# Patient Record
Sex: FEMALE | Race: WHITE | NOT HISPANIC OR LATINO | Employment: OTHER | RURAL
[De-identification: names, ages, dates, MRNs, and addresses within clinical notes are randomized per-mention and may not be internally consistent; named-entity substitution may affect disease eponyms.]

---

## 2017-06-05 RX ORDER — LOSARTAN POTASSIUM 50 MG/1
TABLET ORAL
Qty: 30 TABLET | Refills: 6 | Status: SHIPPED | OUTPATIENT
Start: 2017-06-05 | End: 2017-10-04 | Stop reason: SDUPTHER

## 2017-07-05 RX ORDER — VERAPAMIL HYDROCHLORIDE 240 MG/1
TABLET, FILM COATED, EXTENDED RELEASE ORAL
Qty: 30 TABLET | Refills: 6 | Status: SHIPPED | OUTPATIENT
Start: 2017-07-05 | End: 2017-10-04 | Stop reason: SDUPTHER

## 2017-10-04 RX ORDER — VERAPAMIL HYDROCHLORIDE 240 MG/1
240 TABLET, FILM COATED, EXTENDED RELEASE ORAL NIGHTLY
Qty: 90 TABLET | Refills: 3 | Status: SHIPPED | OUTPATIENT
Start: 2017-10-04 | End: 2018-09-14 | Stop reason: SDUPTHER

## 2017-10-04 RX ORDER — LOSARTAN POTASSIUM 50 MG/1
50 TABLET ORAL DAILY
Qty: 90 TABLET | Refills: 3 | Status: SHIPPED | OUTPATIENT
Start: 2017-10-04 | End: 2018-09-14 | Stop reason: SDUPTHER

## 2017-12-15 ENCOUNTER — OFFICE VISIT (OUTPATIENT)
Dept: CARDIOLOGY | Facility: CLINIC | Age: 67
End: 2017-12-15

## 2017-12-15 VITALS
HEART RATE: 81 BPM | WEIGHT: 225.4 LBS | HEIGHT: 62 IN | DIASTOLIC BLOOD PRESSURE: 90 MMHG | BODY MASS INDEX: 41.48 KG/M2 | SYSTOLIC BLOOD PRESSURE: 160 MMHG

## 2017-12-15 DIAGNOSIS — I10 ESSENTIAL HYPERTENSION: ICD-10-CM

## 2017-12-15 DIAGNOSIS — R00.2 PALPITATIONS: Primary | ICD-10-CM

## 2017-12-15 PROCEDURE — 99213 OFFICE O/P EST LOW 20 MIN: CPT | Performed by: INTERNAL MEDICINE

## 2017-12-15 RX ORDER — AMOXICILLIN 500 MG/1
CAPSULE ORAL EVERY 6 HOURS
COMMUNITY
Start: 2017-12-14 | End: 2019-07-19

## 2017-12-15 NOTE — PROGRESS NOTES
[facility]  Office visit  Nirmala Malik  1950  [phone]    VISIT DATE:  12/15/2017    PCP: Bharti Bowden, APRN  [address] [facility] [id number]    CC:  Chief Complaint   Patient presents with   • Palpitations       PROBLEM LIST:  1. Palpitations:  a. Holter monitor, 04/12/2002:  Sinus rhythm with  frequent PACs; PVCs in a trigeminal pattern on several occasions.  b. Echocardiogram, 05/05/2010, with EF greater than 55%, diastolic dysfunction, trace TR.  c. Stress test in the past with negative ischemia, questionable data deficient.   2. Allergic rhinitis.  3. Anxiety disorder.  4. Obesity.  5. Remote colitis.  6. Remote wisdom tooth extraction.    ASSESSMENT:   Diagnosis Plan   1. Palpitations     2. Essential hypertension         PLAN:  Palpitations: Well-controlled.  Likely symptomatic premature atrial contractions.  Continue verapamil 240 mg by mouth daily    Hypertension: Goal less than 130/80.  Usually well controlled.  Elevated today due to pain from tooth infection.  Continue combination of losartan and verapamil.    Subjective  67-year-old female with a history of hypertension and premature atrial contractions.  Reports her blood pressures typically run less than 130/80 mmHg.  Intermittent mild fatigue in the afternoons.  Blood pressures in the afternoons run in the 115/65 mmHg range.  She is compliant with medical therapy.  Reports left lower jaw discomfort and was on antibiotics for a tooth infection.  Recent family stress due to illness of her parents.  Otherwise reports stable functional capacity.    PHYSICAL EXAMINATION:  There were no vitals filed for this visit.  General Appearance:    Alert, cooperative, no distress, appears stated age   Head:    Normocephalic, without obvious abnormality, atraumatic   Eyes:    conjunctiva/corneas clear   Nose:   Nares normal, septum midline, mucosa normal, no drainage   Throat:   Lips, teeth and gums normal   Neck:    Supple, symmetrical, trachea midline, no carotid    bruit or JVD   Lungs:     Clear to auscultation bilaterally, respirations unlabored   Chest Wall:    No tenderness or deformity    Heart:    Regular rate and rhythm, S1 and S2 normal, no murmur, rub   or gallop, normal carotid impulse bilaterally without bruit.   Abdomen:     Soft, non-tender   Extremities:   Extremities normal, atraumatic, no cyanosis or edema   Pulses:   2+ and symmetric all extremities   Skin:   Skin color, texture, turgor normal, no rashes or lesions       Diagnostic Data:  Procedures  No results found for: CHLPL, TRIG, HDL, LDLDIRECT  No results found for: GLUCOSE, BUN, CREATININE, NA, K, CL, CO2, CREATININE, BUN  No results found for: HGBA1C  No results found for: WBC, HGB, HCT, PLT    Allergies  Allergies   Allergen Reactions   • Codeine    • Penicillins        Current Medications    Current Outpatient Prescriptions:   •  losartan (COZAAR) 50 MG tablet, Take 1 tablet by mouth Daily., Disp: 90 tablet, Rfl: 3  •  Multiple Vitamins-Minerals (EYE VITAMINS & MINERALS PO), Take  by mouth Daily., Disp: , Rfl:   •  omeprazole (priLOSEC) 20 MG capsule, Take 20 mg by mouth Daily., Disp: , Rfl:   •  verapamil SR (CALAN-SR) 240 MG CR tablet, Take 1 tablet by mouth Every Night., Disp: 90 tablet, Rfl: 3  •  VITAMIN D, CHOLECALCIFEROL, PO, Take 1,000 Units by mouth Daily., Disp: , Rfl:   •  amoxicillin (AMOXIL) 500 MG capsule, Every 6 (Six) Hours., Disp: , Rfl:           ROS  Review of Systems   Cardiovascular: Positive for irregular heartbeat and palpitations. Negative for chest pain, dyspnea on exertion, orthopnea and syncope.   Respiratory: Negative for cough, shortness of breath, snoring and wheezing.          SOCIAL HX  Social History     Social History   • Marital status:      Spouse name: N/A   • Number of children: N/A   • Years of education: N/A     Occupational History   • Not on file.     Social History Main Topics   • Smoking status:  Unknown If Ever Smoked   • Smokeless tobacco: Not on file   • Alcohol use Defer   • Drug use: Defer   • Sexual activity: Defer     Other Topics Concern   • Not on file     Social History Narrative   • No narrative on file       FAMILY HX  No family history on file.          Maninder Mathias III, MD, FACC

## 2018-09-14 RX ORDER — LOSARTAN POTASSIUM 50 MG/1
TABLET ORAL
Qty: 90 TABLET | Refills: 3 | Status: SHIPPED | OUTPATIENT
Start: 2018-09-14 | End: 2019-07-19

## 2018-09-14 RX ORDER — VERAPAMIL HYDROCHLORIDE 240 MG/1
TABLET, FILM COATED, EXTENDED RELEASE ORAL
Qty: 90 TABLET | Refills: 3 | Status: SHIPPED | OUTPATIENT
Start: 2018-09-14 | End: 2019-10-02 | Stop reason: SDUPTHER

## 2019-01-18 ENCOUNTER — OFFICE VISIT (OUTPATIENT)
Dept: CARDIOLOGY | Facility: CLINIC | Age: 69
End: 2019-01-18

## 2019-01-18 VITALS
SYSTOLIC BLOOD PRESSURE: 148 MMHG | WEIGHT: 218 LBS | HEIGHT: 55 IN | HEART RATE: 80 BPM | DIASTOLIC BLOOD PRESSURE: 80 MMHG | BODY MASS INDEX: 50.45 KG/M2

## 2019-01-18 DIAGNOSIS — R00.2 PALPITATIONS: Primary | ICD-10-CM

## 2019-01-18 DIAGNOSIS — I10 ESSENTIAL HYPERTENSION: ICD-10-CM

## 2019-01-18 PROCEDURE — 99213 OFFICE O/P EST LOW 20 MIN: CPT | Performed by: INTERNAL MEDICINE

## 2019-01-18 NOTE — PROGRESS NOTES
"      LOCATION:  [facility]     PROBLEM LIST:  1.  Palpitations:  a. Holter monitor, 04/12/2002:  Sinus rhythm with  frequent PACs; PVCs in a trigeminal pattern on several occasions.  b. Echocardiogram, 05/05/2010, with EF greater than 55%, diastolic dysfunction, trace TR.  c. Stress test in the past with negative ischemia, questionable data deficient.   2. Allergic rhinitis.  3. Anxiety disorder.  4. Obesity.  5. Remote colitis.  6. Remote wisdom tooth extraction.     ALLERGIES/DRUG INTOLERANCES:      1. CODEINE.  2. PENICILLIN.     CURRENT MEDICATIONS:        Current Outpatient Medications:   •  losartan (COZAAR) 50 MG tablet, TAKE 1 TABLET DAILY, Disp: 90 tablet, Rfl: 3  •  Multiple Vitamins-Minerals (EYE VITAMINS & MINERALS PO), Take  by mouth Daily., Disp: , Rfl:   •  omeprazole (priLOSEC) 20 MG capsule, Take 20 mg by mouth Daily., Disp: , Rfl:   •  verapamil SR (CALAN-SR) 240 MG CR tablet, TAKE 1 TABLET EVERY NIGHT, Disp: 90 tablet, Rfl: 3  •  VITAMIN D, CHOLECALCIFEROL, PO, Take 1,000 Units by mouth Daily., Disp: , Rfl:   •  amoxicillin (AMOXIL) 500 MG capsule, Every 6 (Six) Hours., Disp: , Rfl:     SUBJECTIVE: Patient is here for followup.  She has been doing well. Having some issues with a rash ashley out in the sun. Palpitations are better but with less sleep and caffeine gets worse. BP more stable as well.      REVIEW OF SYSTEMS:  All pertinent positives of review of systems as per HPI and other parts of notes.  All other review of systems were reviewed and negative.    PHYSICAL EXAMINATION:   /80 (BP Location: Left arm, Patient Position: Sitting)   Pulse 80   Ht 62 cm (24.41\")   Wt 98.9 kg (218 lb)   .25 kg/m²      GENERAL: Pleasant, cooperative, well nourished, well developed. No acute distress. Alert and oriented x3.   HEENT: Normocephalic, atraumatic. EOMI, PERRLA.  NECK: No JVD at 30°. No carotid bruits auscultated. No thyromegaly or cervical  adenopathy noted.  LUNGS: Clear to " auscultation bilaterally. No wheezes, rhonchi, or rales.   CARDIAC: Regular rate and rhythm. Normal S1, S2. No murmurs, gallops, or rubs noted.  ABDOMEN: Soft and nontender. Bowel sounds present. No hepatosplenomegaly.  No bruits noted.  MUSCULOSKELETAL: No bony abnormalities. Normal range of motion.  NEUROLOGIC: No focal deficits. Cranial nerves II-XII grossly intact. Normal gait.   EXTREMITIES: No edema. Pulses 2+.    SKIN: Warm, dry, and intact. No rashes  noted.  PSYCHIATRIC: Normal affect.    IMPRESSION:  1. Palpitations,  most likely consistent with premature atrial contractions and premature ventricular contractions at times and trigeminal pattern on several occasions on Holter monitor in the past. She has occasional palpitations; however, not as severe as currently on  the Verapamil  mg p.o. daily. She is actually doing well overall.   2. Hypertension stable  3. Patient to follow up with us in 6 mths    James Chong DO  11:05 AM  01/18/19

## 2019-01-24 ENCOUNTER — TELEPHONE (OUTPATIENT)
Dept: CARDIOLOGY | Facility: CLINIC | Age: 69
End: 2019-01-24

## 2019-04-05 ENCOUNTER — TELEPHONE (OUTPATIENT)
Dept: CARDIOLOGY | Facility: CLINIC | Age: 69
End: 2019-04-05

## 2019-04-05 NOTE — TELEPHONE ENCOUNTER
"PT calling with more \"Skipped beats\" she was a [facility] pt but hasn't seen anyone else. I transferred her to Kiley Nagel to schedule with a PA or Brando Mahmood.  "

## 2019-05-14 ENCOUNTER — TELEPHONE (OUTPATIENT)
Dept: CARDIOLOGY | Facility: CLINIC | Age: 69
End: 2019-05-14

## 2019-05-14 RX ORDER — AMLODIPINE BESYLATE 5 MG/1
5 TABLET ORAL DAILY
Qty: 30 TABLET | Refills: 11 | Status: SHIPPED | OUTPATIENT
Start: 2019-05-14 | End: 2019-07-19 | Stop reason: ALTCHOICE

## 2019-05-14 NOTE — TELEPHONE ENCOUNTER
PT's losartan 50mg has been recalled she needs another prescription called into [facility] in [facility]. She doesn't see Will Lesvia until July and she was a [facility] patient. Do you mind letting me know what med to call in?

## 2019-05-14 NOTE — TELEPHONE ENCOUNTER
I think all the ARBs are on recall?? Looks like she was on it for BP only. Can switch to Norvasc 5 mg, check BP daily. Call if running high.

## 2019-07-19 ENCOUNTER — OFFICE VISIT (OUTPATIENT)
Dept: CARDIOLOGY | Facility: CLINIC | Age: 69
End: 2019-07-19

## 2019-07-19 VITALS
DIASTOLIC BLOOD PRESSURE: 70 MMHG | HEIGHT: 62 IN | WEIGHT: 220 LBS | SYSTOLIC BLOOD PRESSURE: 130 MMHG | HEART RATE: 87 BPM | OXYGEN SATURATION: 95 % | BODY MASS INDEX: 40.48 KG/M2

## 2019-07-19 DIAGNOSIS — R00.2 PALPITATIONS: Primary | ICD-10-CM

## 2019-07-19 DIAGNOSIS — I10 ESSENTIAL HYPERTENSION: ICD-10-CM

## 2019-07-19 PROCEDURE — 99214 OFFICE O/P EST MOD 30 MIN: CPT | Performed by: PHYSICIAN ASSISTANT

## 2019-07-19 RX ORDER — HYDROCHLOROTHIAZIDE 12.5 MG/1
12.5 TABLET ORAL AS NEEDED
COMMUNITY
End: 2020-01-17 | Stop reason: SDUPTHER

## 2019-07-19 RX ORDER — LOSARTAN POTASSIUM 50 MG/1
50 TABLET ORAL DAILY
Qty: 30 TABLET | Refills: 6 | Status: SHIPPED | OUTPATIENT
Start: 2019-07-19 | End: 2020-01-17 | Stop reason: SDUPTHER

## 2019-07-19 RX ORDER — ERGOCALCIFEROL 1.25 MG/1
50000 CAPSULE ORAL WEEKLY
COMMUNITY
End: 2020-09-18

## 2019-07-19 NOTE — PROGRESS NOTES
[facility]   OFFICE NOTE      Nirmala Malik  1950  PCP: Bharti Bowden APRN    SUBJECTIVE:   Nirmala Malik is a 68 y.o. female seen for a follow up visit regarding the following:     CC: Palpitations         HPI:   Patient presents today follow-up regarding palpitations with remote monitor revealing PACs PVCs.  She recently presented to [facility] has her hospital for work-up regarding recurrent persistent palpitations.  There she had a 24-hour monitor and a stress test and echocardiogram which she states were all reported as normal.  The verapamil has been working well controlling palpitations except and she had a breakthrough episode in May.  She now states palpitations now improved she feels it may been related to a supplement she was taking.  She was concerned to take losartan in the past but was switched to amlodipine and feels she is having side effects from this medication would like to switch back to losartan.  She states her blood pressure is controlled otherwise.  She denies any chest pain suggesting angina.  She denies any heart failure symptoms.    Cardiac PMH: (Old records have been reviewed and summarized below)  1.  Palpitations:  a. Holter monitor, 04/12/2002:  Sinus rhythm with  frequent PACs; PVCs in a trigeminal pattern on several occasions.  b. Echocardiogram, 05/05/2010, with EF greater than 55%, diastolic dysfunction, trace TR.  c. Stress test in the past with negative ischemia, questionable data deficient.   2. Allergic rhinitis.  3. Anxiety disorder.  4. Obesity.  5. Remote colitis.  6. Remote wisdom tooth extraction.           Past Medical History, Past Surgical History, Family history, Social History, and Medications were all reviewed with the patient today and updated as necessary.       Current Outpatient Medications:   •  hydrochlorothiazide (HYDRODIURIL) 12.5 MG tablet, Take 12.5 mg by mouth As Needed., Disp: , Rfl:   •  Multiple  "Vitamins-Minerals (PRESERVISION AREDS PO), Take  by mouth Daily., Disp: , Rfl:   •  omeprazole (priLOSEC) 20 MG capsule, Take 20 mg by mouth Daily., Disp: , Rfl:   •  verapamil SR (CALAN-SR) 240 MG CR tablet, TAKE 1 TABLET EVERY NIGHT, Disp: 90 tablet, Rfl: 3  •  vitamin D (ERGOCALCIFEROL) 99937 units capsule capsule, Take 50,000 Units by mouth 1 (One) Time Per Week., Disp: , Rfl:   •  losartan (COZAAR) 50 MG tablet, Take 1 tablet by mouth Daily., Disp: 30 tablet, Rfl: 6      No Known Allergies  Patient Active Problem List   Diagnosis   • Palpitations   • Essential hypertension     Past Medical History:   Diagnosis Date   • Anxiety    • Arrhythmia    • Colitis      Past Surgical History:   Procedure Laterality Date   • WISDOM TOOTH EXTRACTION       History reviewed. No pertinent family history.  Social History     Tobacco Use   • Smoking status: Never Smoker   • Smokeless tobacco: Never Used   Substance Use Topics   • Alcohol use: No       ROS:  Review of Symptoms:  General: no recent weight loss/gain, weakness or fatigue  Skin: no rashes, lumps, or other skin changes  HEENT: no dizziness, lightheadedness, or vision changes  Respiratory: no cough or hemoptysis  Cardiovascular: + palpitations, and tachycardia  Gastrointestinal: no black/tarry stools or diarrhea  Urinary: no change in frequency or urgency  Peripheral Vascular: no claudication or leg cramps  Musculoskeletal: no muscle or joint pain/stiffness  Psychiatric: no depression or excessive stress  Neurological: no sensory or motor loss, no syncope  Hematologic: no anemia, easy bruising or bleeding  Endocrine: no thyroid problems, nor heat or cold intolerance    PHYSICAL EXAM:    /70 (BP Location: Left arm, Patient Position: Sitting)   Pulse 87   Ht 157.5 cm (62\")   Wt 99.8 kg (220 lb)   SpO2 95%   BMI 40.24 kg/m²        Wt Readings from Last 5 Encounters:   07/19/19 99.8 kg (220 lb)   01/18/19 98.9 kg (218 lb)   12/15/17 102 kg (225 lb 6.4 oz) "   11/18/16 97.5 kg (215 lb)       BP Readings from Last 5 Encounters:   07/19/19 130/70   01/18/19 148/80   12/15/17 160/90   11/18/16 140/80       General appearance - Alert, well appearing, and in no distress   Mental status - Affect appropriate to mood.  Eyes - Sclerae anicteric,  ENMT - Hearing grossly normal bilaterally, Dental hygiene good.  Neck - Carotids upstroke normal bilaterally, no bruits, no JVD.  Resp - Clear to auscultation, no wheezes, rales or rhonchi, symmetric air entry.  Heart - Normal rate, regular rhythm, normal S1, S2, no murmurs, rubs, clicks or gallops.  GI - Soft, nontender, nondistended, no masses or organomegaly.  Neurological - Grossly intact - normal speech, no focal findings  Musculoskeletal - No joint tenderness, deformity or swelling, no muscular tenderness noted.  Extremities - Peripheral pulses normal, no pedal edema, no clubbing or cyanosis.  Skin - Normal coloration and turgor.  Psych -  oriented to person, place, and time.    Medical problems and test results were reviewed with the patient today.     No results found for this or any previous visit (from the past 672 hour(s)).      :      ASSESSMENT   1.  Palpitations: History of frequent PACs and PVCs.  Breakthrough episode in May with admission to [facility] multiple test including Holter monitor stress test and echocardiogram which she reports is normal.  She states her palpitations now improved after she stopped a vitamin supplement she was taking.  2.  Hypertension: Patient feels that she is having side effects on amlodipine.  She like to switch back to her losartan if possible.    PLAN  · Discontinue amlodipine and continue losartan 50 mg daily.  Continue to monitor blood pressure with diet exercise weight loss and sodium restriction.  · Continue verapamil with close monitoring of palpitations if these recur in nature we have asked her to wear monitor otherwise return to follow-up in 6 months or sooner as  needed.    7/19/2019  12:54 PM    Will Lesvia CHIN

## 2019-10-02 RX ORDER — VERAPAMIL HYDROCHLORIDE 240 MG/1
240 TABLET, FILM COATED, EXTENDED RELEASE ORAL NIGHTLY
Qty: 90 TABLET | Refills: 3 | Status: SHIPPED | OUTPATIENT
Start: 2019-10-02 | End: 2020-01-17 | Stop reason: SDUPTHER

## 2020-01-14 NOTE — PROGRESS NOTES
Cardiac Electrophysiology Outpatient Follow Up Note            [facility]     Follow Up Office Visit      Nirmala Malik  [id number]  01/17/2020      Primary Care Physician: Bharti Bowden APRN      Subjective     Chief Complaint:   Chief Complaint   Patient presents with   • Palpitations     Problem List :     1.  Palpitations:  a. Holter monitor, 04/12/2002:  Sinus rhythm with  frequent PACs; PVCs in a trigeminal pattern on several occasions.  b. Echocardiogram, 05/05/2010, with EF greater than 55%, diastolic dysfunction, trace TR.  c. Stress test in the past with negative ischemia, questionable data deficient.   d. Breakthrough episode in May 2019 with admission to [facility] multiple test including Holter monitor stress test and echocardiogram which she reports is normal.  2. Allergic rhinitis.  3. Anxiety disorder.  4. Obesity.  5. Remote colitis.  6. Remote wisdom tooth extraction      History of [place] Illness :  Mrs. Malik is a 69 year old white female seen in EP follow up today for management of her palpitations.  She is currently maintained on Verapamil therapy and was last seen in EP follow up 7/19/2019 with Rory Lakhani PA-C.  She presents today with minimal reports of recurrent episodes of palpitations occurring briefly once or twice a month.  She denies CP, dizziness, unusual SOA, presyncope, or syncope.  She admits to compliance with her medication therapy without side effects.  Her BP is slightly elevated today and she admits to not checking it at home.  She does report an intentional 6 lb wt loss over the past 2 months.      Review of Systems:   Constitutional: No fevers or chills, no recent weight gain or weight loss or fatigue  Eyes: No visual loss, blurred vision, double vision, yellow sclerae.  ENT: No headaches, hearing loss, vertigo, congestion or sore throat.   Cardiovascular: Per HPI  Respiratory: No cough or wheezing, no sputum  production, no hematemesis   Gastrointestinal: No abdominal pain, no nausea, vomiting, constipation, diarrhea, melena.   Genitourinary: No dysuria, hematuria or increased frequency.  Musculoskeletal:  No gait disturbance, weakness or joint pain or stiffness  Integumentary: No rashes, urticaria, ulcers or sores.   Neurological: No headache, dizziness, syncope, paralysis, ataxia, no prior CVA/TIA  Psychiatric: No anxiety, or depression  Endocrine: No diaphoresis, cold or heat intolerance. No polyuria or polydipsia.   Hematologic/Lymphatic: No anemia, abnormal bruising or bleeding. No history of DVT/PE.       Past Medical History:   Past Medical History:   Diagnosis Date   • Anxiety    • Arrhythmia    • Colitis        Past Surgical History:   Past Surgical History:   Procedure Laterality Date   • WISDOM TOOTH EXTRACTION         Family History:   Family History   Problem Relation Age of Onset   • No Known Problems Mother        Social History:   Social History     Socioeconomic History   • Marital status:      Spouse name: Not on file   • Number of children: Not on file   • Years of education: Not on file   • Highest education level: Not on file   Tobacco Use   • Smoking status: Never Smoker   • Smokeless tobacco: Never Used   Substance and Sexual Activity   • Alcohol use: No   • Drug use: No   • Sexual activity: Defer       Medications:     Current Outpatient Medications:   •  hydrochlorothiazide (HYDRODIURIL) 12.5 MG tablet, Take 12.5 mg by mouth As Needed., Disp: , Rfl:   •  losartan (COZAAR) 50 MG tablet, Take 1 tablet by mouth Daily., Disp: 30 tablet, Rfl: 6  •  Multiple Vitamins-Minerals (PRESERVISION AREDS PO), Take  by mouth Daily., Disp: , Rfl:   •  omeprazole (priLOSEC) 20 MG capsule, Take 20 mg by mouth Daily., Disp: , Rfl:   •  verapamil SR (CALAN-SR) 240 MG CR tablet, Take 1 tablet by mouth Every Night., Disp: 90 tablet, Rfl: 3  •  vitamin D (ERGOCALCIFEROL) 19184 units capsule capsule, Take 50,000  "Units by mouth 1 (One) Time Per Week., Disp: , Rfl:     Allergies:   No Known Allergies    Objective     Physical Exam:  Vital Signs:   Vitals:    01/17/20 1045   BP: 144/86   BP Location: Right arm   Patient Position: Sitting   Pulse: 88   SpO2: 98%   Weight: 98.4 kg (217 lb)   Height: 157.5 cm (62\")     GEN: Well nourished, well-developed, no acute distress  HEENT: Normocephalic, atraumatic, moist mucous membranes  NECK: Supple, no JVD, no thyromegaly, no lymphadenopathy  CARD: Regular rate and rhythm, normal S1 & S2 are present.  No murmur, gallop or rubs are appreciated.  LUNGS: Clear to auscultation bilateraly, normal respiratory effort  ABDOMEN: Soft, nontender, normal bowel sounds  EXTREMITIES: No gross deformities, no clubbing, cyanosis.  Mild BLE Edema   SKIN: Warm, dry  NEURO: No focal deficits, alert and oriented x 3  PSYCHIATRIC: Normal affect and mood, appropriate use of semantics and logic.      Cardiac Testing:     I personally viewed and interpreted the patient's EKG/Telemetry/lab data      Assessment & Plan      Nirmala was seen today for palpitations.    Diagnoses and all orders for this visit:    1.  Palpitations     2.  Essential hypertension    3.  Obesity   Obesity Counseling:    I discussed with the patient that they are obese.  We discussed that obesity is a significant risk factor for heart disease, hypertension, diabetes, other forms of health problems and indeed premature death.  We discussed that it is critical that the patient loose weight to minimize their risk of excess morbidity and mortality.  We further discussed various options regarding weight loss strategies including dietary caloric restriction, exercise, referral to a dietitian and possibly also bariatric surgical options   (which are appropriate for some but not all patients ). We spent over 15 minutes reviewing records and discussing weight loss options. They voiced a clear understanding of these medical facts.  They voice a " clear understanding of my medical advice that they endeavor to loose weight as their obesity is adversely affecting their health.      Plan:  Mrs. Malik is seen in follow up today regarding her history of palpitations.  She is stable and doing well on her current medication therapy.  Her BP is slightly elevated today but noted to be acceptable on last visit.  She is encouraged to keep BP log and report consistent reading > 140/90.      Follow Up: 6 month      Thank you for allowing me to participate in the care of your patient. Please to not hesitate to contact me with additional questions or concerns.        Electronically signed by CANDELARIO Ko, 01/17/20, 11:18 AM.

## 2020-01-17 ENCOUNTER — OFFICE VISIT (OUTPATIENT)
Dept: CARDIOLOGY | Facility: CLINIC | Age: 70
End: 2020-01-17

## 2020-01-17 VITALS
DIASTOLIC BLOOD PRESSURE: 86 MMHG | HEART RATE: 88 BPM | HEIGHT: 62 IN | WEIGHT: 217 LBS | SYSTOLIC BLOOD PRESSURE: 144 MMHG | BODY MASS INDEX: 39.93 KG/M2 | OXYGEN SATURATION: 98 %

## 2020-01-17 DIAGNOSIS — R00.2 PALPITATIONS: Primary | ICD-10-CM

## 2020-01-17 DIAGNOSIS — I10 ESSENTIAL HYPERTENSION: ICD-10-CM

## 2020-01-17 PROCEDURE — 99213 OFFICE O/P EST LOW 20 MIN: CPT | Performed by: NURSE PRACTITIONER

## 2020-01-17 RX ORDER — LOSARTAN POTASSIUM 50 MG/1
50 TABLET ORAL DAILY
Qty: 90 TABLET | Refills: 3 | Status: SHIPPED | OUTPATIENT
Start: 2020-01-17 | End: 2020-12-28

## 2020-01-17 RX ORDER — VERAPAMIL HYDROCHLORIDE 240 MG/1
240 TABLET, FILM COATED, EXTENDED RELEASE ORAL NIGHTLY
Qty: 90 TABLET | Refills: 3 | Status: SHIPPED | OUTPATIENT
Start: 2020-01-17 | End: 2021-03-29

## 2020-01-17 RX ORDER — HYDROCHLOROTHIAZIDE 12.5 MG/1
12.5 TABLET ORAL AS NEEDED
Qty: 60 TABLET | Refills: 3 | Status: SHIPPED | OUTPATIENT
Start: 2020-01-17 | End: 2020-08-10

## 2020-05-26 ENCOUNTER — TELEPHONE (OUTPATIENT)
Dept: CARDIOLOGY | Facility: CLINIC | Age: 70
End: 2020-05-26

## 2020-05-26 NOTE — TELEPHONE ENCOUNTER
"Patient called to report that her mother passed away yesterday and she wanted a prescription for \"something to calm her down\". I requested that she call her PCP for this medication.  "

## 2020-08-10 RX ORDER — HYDROCHLOROTHIAZIDE 12.5 MG/1
TABLET ORAL
Qty: 60 TABLET | Refills: 5 | Status: SHIPPED | OUTPATIENT
Start: 2020-08-10

## 2020-09-18 ENCOUNTER — OFFICE VISIT (OUTPATIENT)
Dept: CARDIOLOGY | Facility: CLINIC | Age: 70
End: 2020-09-18

## 2020-09-18 VITALS
BODY MASS INDEX: 38.92 KG/M2 | HEIGHT: 62 IN | WEIGHT: 211.5 LBS | SYSTOLIC BLOOD PRESSURE: 124 MMHG | HEART RATE: 88 BPM | OXYGEN SATURATION: 98 % | DIASTOLIC BLOOD PRESSURE: 78 MMHG

## 2020-09-18 DIAGNOSIS — E78.5 HYPERLIPIDEMIA, UNSPECIFIED HYPERLIPIDEMIA TYPE: ICD-10-CM

## 2020-09-18 DIAGNOSIS — R00.2 PALPITATIONS: ICD-10-CM

## 2020-09-18 DIAGNOSIS — R07.89 CHEST PAIN, ATYPICAL: ICD-10-CM

## 2020-09-18 DIAGNOSIS — R07.1 CHEST PAIN ON BREATHING: Primary | ICD-10-CM

## 2020-09-18 PROCEDURE — 99214 OFFICE O/P EST MOD 30 MIN: CPT | Performed by: INTERNAL MEDICINE

## 2020-09-18 RX ORDER — ACETAMINOPHEN 325 MG/1
650 TABLET ORAL AS NEEDED
COMMUNITY
End: 2021-09-13

## 2020-09-18 NOTE — PROGRESS NOTES
Cardiac Electrophysiology Outpatient Follow Up Note            [facility] at [facility]    Follow Up Office Visit      Nirmala Malik  [id number]  09/18/2020  [unfilled]  [unfilled]    Primary Care Physician: Bharti Bowden APRN    Referred By: No ref. provider found    Subjective     Chief Complaint:   Chief Complaint   Patient presents with   • Palpitations       History of Present Illness:   Mrs. Nirmala Malik is a 69 y.o. female who presents to my electrophysiology clinic for follow up of PACs PVCs palpitations.  She is also having some exertional chest discomfort which is sharp and squeezing.  This happens really when she is under stress but really with exertion also.  She is been evaluated 2 years ago with a stress test which was negative.    She describes to me the traumatic death of her mother unexpectedly during a hospital visit at a [facility] hospital nearby.  This was quite disturbing and traumatic to her.    She has really had very few palpitations certainly no syncope presyncope..      Review of Systems:   Constitutional: No fevers or chills, no recent weight gain or weight loss or fatigue  Eyes: No visual loss, blurred vision, double vision, yellow sclerae.  ENT: No headaches, hearing loss, vertigo, congestion or sore throat.   Cardiovascular: Per HPI  Respiratory: No cough or wheezing, no sputum production, no hematemesis   Gastrointestinal: No abdominal pain, no nausea, vomiting, constipation, diarrhea, melena.   Genitourinary: No dysuria, hematuria or increased frequency.  Musculoskeletal:  No gait disturbance, weakness or joint pain or stiffness  Integumentary: No rashes, urticaria, ulcers or sores.   Neurological: No headache, dizziness, syncope, paralysis, ataxia, no prior CVA/TIA  Psychiatric: No anxiety, or depression  Endocrine: No diaphoresis, cold or heat intolerance. No polyuria or polydipsia.   Hematologic/Lymphatic: No anemia,  "abnormal bruising or bleeding. No history of DVT/PE.      Past Medical History:   Past Medical History:   Diagnosis Date   • Anxiety    • Arrhythmia    • Colitis        Past Surgical History:   Past Surgical History:   Procedure Laterality Date   • WISDOM TOOTH EXTRACTION         Family History:   Family History   Problem Relation Age of Onset   • No Known Problems Mother        Social History:   Social History     Socioeconomic History   • Marital status:      Spouse name: Not on file   • Number of children: Not on file   • Years of education: Not on file   • Highest education level: Not on file   Tobacco Use   • Smoking status: Never Smoker   • Smokeless tobacco: Never Used   Substance and Sexual Activity   • Alcohol use: No   • Drug use: No   • Sexual activity: Defer       Medications:     Current Outpatient Medications:   •  acetaminophen (TYLENOL) 325 MG tablet, Take 650 mg by mouth As Needed for Mild Pain ., Disp: , Rfl:   •  hydroCHLOROthiazide (HYDRODIURIL) 12.5 MG tablet, TAKE 1 TABLET AS NEEDED FOR EDEMA, Disp: 60 tablet, Rfl: 5  •  losartan (COZAAR) 50 MG tablet, Take 1 tablet by mouth Daily., Disp: 90 tablet, Rfl: 3  •  Multiple Vitamins-Minerals (PRESERVISION AREDS PO), Take  by mouth Daily., Disp: , Rfl:   •  omeprazole (priLOSEC) 20 MG capsule, Take 20 mg by mouth Daily., Disp: , Rfl:   •  verapamil SR (CALAN-SR) 240 MG CR tablet, Take 1 tablet by mouth Every Night., Disp: 90 tablet, Rfl: 3    Allergies:   No Known Allergies    Objective     Physical Exam:  Vital Signs:   Vitals:    09/18/20 1115   BP: 124/78   BP Location: Left arm   Patient Position: Sitting   Pulse: 88   SpO2: 98%   Weight: 95.9 kg (211 lb 8 oz)   Height: 157.5 cm (62\")     GEN: Well nourished, well-developed, no acute distress  HEENT: Normocephalic, atraumatic, moist mucous membranes  NECK: Supple, no JVD, no thyromegaly, no lymphadenopathy  CARD: Regular rate and rhythm, normal S1 & S2 are present.  No murmur, gallop or " rubs are appreciated.  LUNGS: Clear to auscultation bilateraly, normal respiratory effort  ABDOMEN: Soft, nontender, normal bowel sounds  EXTREMITIES: No gross deformities, no clubbing, cyanosis.  Edema none  SKIN: Warm, dry  NEURO: No focal deficits, alert and oriented x 3  PSYCHIATRIC: Normal affect and mood, appropriate use of semantics and logic.        No results found for: GLUCOSE, CALCIUM, NA, K, CO2, CL, BUN, CREATININE, EGFRIFAFRI, EGFRIFNONA, BCR, ANIONGAP  No results found for: WBC, HGB, HCT, MCV, PLT  No results found for: INR, PROTIME  No results found for: TSH, I2CCLOJ, C9WVROF, THYROIDAB    Cardiac Testing:     I personally viewed and interpreted the patient's EKG/Telemetry/lab data    Procedures    Tobacco Cessation: N/A  Obstructive Sleep Apnea Screening: N/A    Assessment & Plan    Very pleasant 69-year-old female patient previously with structurally normal heart and atypical chest symptoms.  She has been under a lot of stress due to the death of apparent numerous other issues and I think would be best served by C ischemic evaluation.  We will order a exercise stress test specifically a treadmill echo.  Regarding her PACs and PVCs these are really at bay at this point.  We will continue verapamil.    I will check a fasting lipid panel.    FU with me in one month    There are no diagnoses linked to this encounter.      Follow Up:       Thank you for allowing me to participate in the care of your patient. Please to not hesitate to contact me with additional questions or concerns.        Adair Hyman DO, [facility], [facility]  Cardiac Electrophysiologist

## 2020-10-15 ENCOUNTER — APPOINTMENT (OUTPATIENT)
Dept: CARDIOLOGY | Facility: HOSPITAL | Age: 70
End: 2020-10-15

## 2020-12-28 RX ORDER — LOSARTAN POTASSIUM 50 MG/1
TABLET ORAL
Qty: 90 TABLET | Refills: 3 | Status: SHIPPED | OUTPATIENT
Start: 2020-12-28 | End: 2021-11-19

## 2021-03-29 RX ORDER — VERAPAMIL HYDROCHLORIDE 240 MG/1
TABLET, FILM COATED, EXTENDED RELEASE ORAL
Qty: 90 TABLET | Refills: 3 | Status: SHIPPED | OUTPATIENT
Start: 2021-03-29 | End: 2022-03-01

## 2021-08-16 ENCOUNTER — TELEPHONE (OUTPATIENT)
Dept: CARDIOLOGY | Facility: CLINIC | Age: 71
End: 2021-08-16

## 2021-08-16 NOTE — TELEPHONE ENCOUNTER
Pt has had irregular HR for 5 days. HR is running in 60-70's. She says its skipping or hesitating. Verapamil SR 240mg 1 daily, losartan 50mg 1 daily. She just lost her her father a month ago and witnessed her mother have a MI and pass away last yr. She is currently dealing with estate issues with her fathers estate. She wants to know if she should go up on her verapamil. Her BP last night was 102/58. She is seeing her PCP this afternoon. I advised she get an EKG while she is there.

## 2021-09-13 ENCOUNTER — OFFICE VISIT (OUTPATIENT)
Dept: CARDIOLOGY | Facility: CLINIC | Age: 71
End: 2021-09-13

## 2021-09-13 VITALS
BODY MASS INDEX: 37.91 KG/M2 | WEIGHT: 206 LBS | HEIGHT: 62 IN | DIASTOLIC BLOOD PRESSURE: 70 MMHG | OXYGEN SATURATION: 96 % | SYSTOLIC BLOOD PRESSURE: 132 MMHG | HEART RATE: 98 BPM

## 2021-09-13 DIAGNOSIS — R07.9 CHEST PAIN ON EXERTION: Primary | ICD-10-CM

## 2021-09-13 DIAGNOSIS — I10 ESSENTIAL HYPERTENSION: ICD-10-CM

## 2021-09-13 DIAGNOSIS — I47.1 ATRIAL TACHYCARDIA (HCC): ICD-10-CM

## 2021-09-13 DIAGNOSIS — R00.2 PALPITATIONS: Primary | ICD-10-CM

## 2021-09-13 DIAGNOSIS — G47.33 OSA (OBSTRUCTIVE SLEEP APNEA): ICD-10-CM

## 2021-09-13 DIAGNOSIS — I47.29 NONSUSTAINED VENTRICULAR TACHYCARDIA (HCC): ICD-10-CM

## 2021-09-13 PROCEDURE — 99214 OFFICE O/P EST MOD 30 MIN: CPT | Performed by: INTERNAL MEDICINE

## 2021-09-13 RX ORDER — ATORVASTATIN CALCIUM 20 MG/1
20 TABLET, FILM COATED ORAL
COMMUNITY
Start: 2021-07-19

## 2021-09-13 RX ORDER — FLUTICASONE PROPIONATE 50 MCG
SPRAY, SUSPENSION (ML) NASAL AS NEEDED
COMMUNITY
Start: 2021-07-20

## 2021-09-13 RX ORDER — ACETAMINOPHEN 500 MG
500 TABLET ORAL EVERY 6 HOURS PRN
COMMUNITY

## 2021-09-13 RX ORDER — CHOLECALCIFEROL (VITAMIN D3) 1250 MCG
CAPSULE ORAL
COMMUNITY
Start: 2021-07-23

## 2021-09-13 RX ORDER — NORTRIPTYLINE HYDROCHLORIDE 25 MG/1
25 CAPSULE ORAL NIGHTLY
COMMUNITY

## 2021-09-13 NOTE — PROGRESS NOTES
Cardiac Electrophysiology Outpatient Follow Up Note            [facility]    Follow Up Office Visit      Nirmala Malik  [id number]  2021  [unfilled]  [unfilled]    Primary Care Physician: Lashaun Obrien PA    Referred By: No ref. provider found    Subjective     Chief Complaint:   Diagnoses and all orders for this visit:    1. Palpitations (Primary)    2. Essential hypertension    3. Nonsustained ventricular tachycardia (CMS/HCC)    4. Atrial tachycardia (CMS/HCC)      Chief Complaint   Patient presents with   • Essential hypertension       History of Present Illness:   Nirmala Malik is a 70 y.o. female who presents to my electrophysiology clinic for follow up of above complaints.  Leana is feels more palpitations.  Their father recently .  This is of) 70 to the death of her mother about 18 months ago.  She continues to have palpitations.  She did not lose weight.  She does not feel rested she wakes up at night sometimes occasionally panicking.  No chest pain nausea vomit fevers or chills.  Clearly having significant palpitations however and is correlate on an ambulatory heart rhythm monitor with nonsustained VT as well as short runs of atrial tachycardia..      Review of Systems:   Constitutional: No fevers or chills, no recent weight gain or weight loss or fatigue  Eyes: No visual loss, blurred vision, double vision, yellow sclerae.  ENT: No headaches, hearing loss, vertigo, congestion or sore throat.   Cardiovascular: Per HPI  Respiratory: No cough or wheezing, no sputum production, no hematemesis   Gastrointestinal: No abdominal pain, no nausea, vomiting, constipation, diarrhea, melena.   Genitourinary: No dysuria, hematuria or increased frequency.  Musculoskeletal:  No gait disturbance, weakness or joint pain or stiffness  Integumentary: No rashes, urticaria, ulcers or sores.   Neurological: No headache, dizziness, syncope,  paralysis, ataxia, no prior CVA/TIA  Psychiatric: No anxiety, or depression  Endocrine: No diaphoresis, cold or heat intolerance. No polyuria or polydipsia.   Hematologic/Lymphatic: No anemia, abnormal bruising or bleeding. No history of DVT/PE.      Past Medical History:   Past Medical History:   Diagnosis Date   • Anxiety    • Arrhythmia    • Colitis        Past Surgical History:   Past Surgical History:   Procedure Laterality Date   • WISDOM TOOTH EXTRACTION         Family History:   Family History   Problem Relation Age of Onset   • No Known Problems Mother        Social History:   Social History     Socioeconomic History   • Marital status:      Spouse name: Not on file   • Number of children: Not on file   • Years of education: Not on file   • Highest education level: Not on file   Tobacco Use   • Smoking status: Never Smoker   • Smokeless tobacco: Never Used   Substance and Sexual Activity   • Alcohol use: No   • Drug use: No   • Sexual activity: Defer       Medications:     Current Outpatient Medications:   •  acetaminophen (TYLENOL) 500 MG tablet, Take 500 mg by mouth Every 6 (Six) Hours As Needed for Mild Pain ., Disp: , Rfl:   •  atorvastatin (LIPITOR) 20 MG tablet, Take 20 mg by mouth every night at bedtime., Disp: , Rfl:   •  Cholecalciferol (Vitamin D3) 1.25 MG (95995 UT) capsule, TAKE 1 TABLET BY MOUTH DAILY, Disp: , Rfl:   •  fluticasone (FLONASE) 50 MCG/ACT nasal spray, As Needed., Disp: , Rfl:   •  hydroCHLOROthiazide (HYDRODIURIL) 12.5 MG tablet, TAKE 1 TABLET AS NEEDED FOR EDEMA, Disp: 60 tablet, Rfl: 5  •  losartan (COZAAR) 50 MG tablet, TAKE 1 TABLET DAILY, Disp: 90 tablet, Rfl: 3  •  Multiple Vitamins-Minerals (PRESERVISION AREDS PO), Take  by mouth Daily., Disp: , Rfl:   •  nortriptyline (PAMELOR) 25 MG capsule, Take 25 mg by mouth Every Night., Disp: , Rfl:   •  omeprazole (priLOSEC) 20 MG capsule, Take 20 mg by mouth Daily., Disp: , Rfl:   •  verapamil SR (CALAN-SR) 240 MG CR tablet,  "TAKE 1 TABLET EVERY NIGHT (Patient taking differently: 240 mg Daily.), Disp: 90 tablet, Rfl: 3    Allergies:   No Known Allergies    Objective   Vital Signs:   Vitals:    09/13/21 1407   BP: 132/70   BP Location: Left arm   Patient Position: Sitting   Pulse: 98   SpO2: 96%   Weight: 93.4 kg (206 lb)   Height: 157.5 cm (62\")       PHYSICAL EXAM  General appearance: Awake, alert, cooperative  Head: Normocephalic, without obvious abnormality, atraumatic  Eyes: Conjunctivae/corneas clear, EOMs intact  Neck: no adenopathy, no carotid bruit, no JVD and thyroid: not enlarged  Lungs: clear to auscultation bilaterally and no rhonchi or crackles\", ' symmetric  Heart: regular rate and rhythm, S1, S2 normal, no murmur, click, rub or gallop  Abdomen: Soft, non-tender, bowel sounds normal,  no organomegaly  Extremities: extremities normal, atraumatic, no cyanosis or edema  Skin: Skin color, turgor normal, no rashes or lesions  Neurologic: Grossly normal     No results found for: GLUCOSE, CALCIUM, NA, K, CO2, CL, BUN, CREATININE, EGFRIFAFRI, EGFRIFNONA, BCR, ANIONGAP  No results found for: WBC, HGB, HCT, MCV, PLT  No results found for: INR, PROTIME  No results found for: TSH, E1EUKAJ, L7ALSJC, THYROIDAB    Cardiac Testing:     I personally viewed and interpreted the patient's EKG/Telemetry/lab data    Procedures    Tobacco Cessation: N/A  Obstructive Sleep Apnea Screening: Completed    Assessment & Plan    Diagnoses and all orders for this visit:    1. Palpitations (Primary)    2. Essential hypertension    3. Nonsustained ventricular tachycardia (CMS/HCC)    4. Atrial tachycardia (CMS/HCC)         Diagnosis Plan   1. Palpitations   multifactorial.  There arrhythmia on ambulatory heart rhythm monitor however probably also some stress component as well.  A lot of life stressors including death of 2 parents and short time.   2. Essential hypertension   blood pressure well controlled.  Continue sodium restriction continue " hydrochlorothiazide.   3. Nonsustained ventricular tachycardia (CMS/HCC)   need to define underlying cardiac structure and milieu.  Stress test is required.  If stress test is relatively unremarkable would recommend metoprolol therapy.   4. Atrial tachycardia (CMS/HCC)   as above.  Also additionally consider occult sleep apnea.  She is fatigued.    Watch Pat     EPWORTH SLEEPING ASSESMENT    The patient exhibits the following signs and symptoms of sleep disordered breathing; snoring, daytime sleepiness, observed apneas, choking or gasping during sleep, morning headaches and irritability/moodiness.    The patient has been experiencing symptoms for 10 month(s).    The patient's sleepiness has been evaluated with an [place] Sleepiness Scale.                                                        [place] Sleepiness Scale    Situation Chance of Dozing or Sleeping   Sitting and reading 2 - moderate chance of dosing or sleeping   Watching TV 2 - moderate chance of dosing or sleeping   Sitting inactive in a public place 2 - moderate chance of dosing or sleeping   Being a passenger in a motor vehicle for an hour or more 2 - moderate chance of dosing or sleeping   Lying down in the afternoon 2 - moderate chance of dosing or sleeping   Sitting and talking to someone 2 - moderate chance of dosing or sleeping   Sitting quietly after lunch (no alcohol) 2 - moderate chance of dosing or sleeping   Stopped for a few minutes in traffic while driving 2 - moderate chance of dosing or sleeping   Total score (add the scores up) 16       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     Body mass index is 37.68 kg/m².    I spent 28 minutes in consultation with this patient which included more than 65% of this time in direct face-to-face counseling, physical examination and discussion of my assessment and findings and shared decision making with the patient.  The remainder of the time not spent face to face was performing one,  some or all of the following actions:  preparing to see this patient ( eg. Review of tests),  ordering medications, tests or procedures ), care coordination, discussion of the plan with other healthcare providers, documenting clinical information in Epic well as independently interpreting results and communicating results to patient, family and or caregiver.  All time noted occurred on the date of service.    Follow Up:       Thank you for allowing me to participate in the care of your patient. Please to not hesitate to contact me with additional questions or concerns.      Adair Hyman DO, FACC, RS  Cardiac Electrophysiologist  [facility]

## 2021-11-19 RX ORDER — LOSARTAN POTASSIUM 50 MG/1
TABLET ORAL
Qty: 90 TABLET | Refills: 3 | Status: SHIPPED | OUTPATIENT
Start: 2021-11-19

## 2022-03-01 RX ORDER — VERAPAMIL HYDROCHLORIDE 240 MG/1
TABLET, FILM COATED, EXTENDED RELEASE ORAL
Qty: 90 TABLET | Refills: 3 | Status: SHIPPED | OUTPATIENT
Start: 2022-03-01